# Patient Record
Sex: MALE | Race: BLACK OR AFRICAN AMERICAN | NOT HISPANIC OR LATINO | Employment: UNEMPLOYED | URBAN - METROPOLITAN AREA
[De-identification: names, ages, dates, MRNs, and addresses within clinical notes are randomized per-mention and may not be internally consistent; named-entity substitution may affect disease eponyms.]

---

## 2024-04-01 ENCOUNTER — OFFICE VISIT (OUTPATIENT)
Dept: FAMILY MEDICINE | Facility: CLINIC | Age: 59
End: 2024-04-01
Payer: COMMERCIAL

## 2024-04-01 ENCOUNTER — ANCILLARY PROCEDURE (OUTPATIENT)
Dept: GENERAL RADIOLOGY | Facility: CLINIC | Age: 59
End: 2024-04-01
Attending: FAMILY MEDICINE
Payer: COMMERCIAL

## 2024-04-01 ENCOUNTER — TELEPHONE (OUTPATIENT)
Dept: FAMILY MEDICINE | Facility: CLINIC | Age: 59
End: 2024-04-01

## 2024-04-01 VITALS
OXYGEN SATURATION: 99 % | HEART RATE: 53 BPM | BODY MASS INDEX: 26.37 KG/M2 | SYSTOLIC BLOOD PRESSURE: 142 MMHG | HEIGHT: 73 IN | WEIGHT: 199 LBS | RESPIRATION RATE: 16 BRPM | DIASTOLIC BLOOD PRESSURE: 84 MMHG

## 2024-04-01 DIAGNOSIS — M25.561 BILATERAL CHRONIC KNEE PAIN: Primary | ICD-10-CM

## 2024-04-01 DIAGNOSIS — M25.562 BILATERAL CHRONIC KNEE PAIN: Primary | ICD-10-CM

## 2024-04-01 DIAGNOSIS — M25.562 BILATERAL CHRONIC KNEE PAIN: ICD-10-CM

## 2024-04-01 DIAGNOSIS — M25.561 BILATERAL CHRONIC KNEE PAIN: ICD-10-CM

## 2024-04-01 DIAGNOSIS — G89.29 BILATERAL CHRONIC KNEE PAIN: Primary | ICD-10-CM

## 2024-04-01 DIAGNOSIS — G89.29 BILATERAL CHRONIC KNEE PAIN: ICD-10-CM

## 2024-04-01 PROCEDURE — 73562 X-RAY EXAM OF KNEE 3: CPT | Mod: TC | Performed by: RADIOLOGY

## 2024-04-01 PROCEDURE — 99204 OFFICE O/P NEW MOD 45 MIN: CPT | Mod: GC

## 2024-04-01 RX ORDER — ACETAMINOPHEN 500 MG
500-1000 TABLET ORAL EVERY 6 HOURS PRN
Qty: 200 TABLET | Refills: 1 | Status: SHIPPED | OUTPATIENT
Start: 2024-04-01 | End: 2024-07-17

## 2024-04-01 NOTE — TELEPHONE ENCOUNTER
Forms Request  Name of form/paperwork:  Request for medical opinion  Do we have the form: NO  When is form needed by: ASAP   How would you like the form returned: FAX. Pt will also like a copy after filled out  Fax: [phone]  Patient Notified form requests are processed in 10 business days:  YES  Okay to leave a detailed message? YES

## 2024-04-01 NOTE — COMMUNITY RESOURCES LIST (ENGLISH)
April 1, 2024           YOUR PERSONALIZED LIST OF SERVICES & PROGRAMS           NAVIGATION    Eligibility Screening      Sure - Navigators  Phone: [phone]  Website: https://www.Pervacioorg/about-us/assister-program/navigators/index.jsp  Language: English  Hours: Mon 8:00 AM - 4:00 PM Tue 8:00 AM - 4:00 PM Wed 8:00 AM - 4:00 PM Thu 8:00 AM - 4:00 PM      [employer] [place] - SNAP (formerly food stamps) Screening and Application help  Phone: [phone] [phone]  Website: https://www.[employer].org/programs/[place]-food-helpline/  Language: English  Hours: Mon 10:00 AM - 5:00 PM Tue 10:00 AM - 5:00 PM Wed 10:00 AM - 5:00 PM Thu 10:00 AM - 5:00 PM Fri 10:00 AM - 5:00 PM  Fee: Free  Accessibility: Ada accessible, Blind accommodation, Deaf or hard of hearing, Translation services      Sure - Certified Application Counselor (CAC)  Phone: [phone]  Website: https://www.Pervacioorg/about-us/assister-program/cacs/index.jsp  Language: English  Hours: Mon 8:00 AM - 4:00 PM Tue 8:00 AM - 4:00 PM Wed 8:00 AM - 4:00 PM Thu 8:00 AM - 4:00 PM        ASSISTANCE    Nutrition Benefits      - SNAP Eligibility Assistance  Website: https://www.[employer]/  Language: English  Fee: Free      [employer] [place] - SNAP (formerly food stamps) Screening and Application help  Phone: (623) 529-7342  Website: https://www.[employer].org/programs/[place]-food-helpline/  Language: English  Hours: Mon 10:00 AM - 5:00 PM Tue 10:00 AM - 5:00 PM Wed 10:00 AM - 5:00 PM Thu 10:00 AM - 5:00 PM Fri 10:00 AM - 5:00 PM  Fee: Free  Accessibility: Ada accessible, Blind accommodation, Deaf or hard of hearing, Translation services      [employer] [place] [employer]  Phone: ([phone]  Website: https://www.hungersolutions.org/programs/market-bucks/  Language: English  Hours: Mon 10:00 AM - 5:00 PM Tue 10:00 AM - 5:00 PM Wed 10:00 AM - 5:00 PM Thu 10:00 AM - 5:00 PM Fri 10:00 AM - 5:00 PM  Fee: Self pay    Pantry      House  - Food Support  [address] [place] [address] (Distance: 2.7 miles)  Phone: [phone]) [phone]  Website: https://www.[facility].org  Language: English, Hmong, Wallisian  Fee: Free  Accessibility: Ada accessible, Blind accommodation, Deaf or hard of hearing, Translation services      in the [place] - Food in the [place] at [facility]  [address] [place] [address] (Distance: 10.9 miles)  Phone: [phone] [phone]  Website: https://www.goodintZoomhood.org/our-programs/feeding-the-future/food-in-the-parker/  Language: English  Fee: Free  Accessibility: Ada accessible      Basket Food Shelf - [place] Basket Food Shelf  Phone: (375) [phone]  Website: www.[facility].org  Language: English, Wallisian  Hours: Mon 9:00 AM - 3:30 PM Tue 9:00 AM - 6:30 PM Wed 9:00 AM - 3:30 PM Thu 9:00 AM - 12:30 PM Fri 9:00 AM - 12:30 PM Sat 9:00 AM - 12:00 PM  Fee: Free        & SHELTER    Housing      Free - Client Services  [address] [place] [address] (Distance: 5.8 miles)  Phone: [phone]) [phone]  Website: https://Questra.Jobpartners/  Language: English  Fee: Free  Transportation Options: Free transportation      [employer] [place] - YOUTH half-way  Phone: ([phone]) 512-8388  Website: https://www.WanderflyraXetal.org/  Language: English      Health Link - Housing Stabilization Services  Phone: (392) 542-9513  Website: https://Vhoto.Lookwider/Housing-Stabilization.html  Language: English  Hours: Mon 9:00 AM - 5:00 PM Tue 9:00 AM - 5:00 PM Wed 9:00 AM - 5:00 PM Thu 9:00 AM - 5:00 PM Fri 9:00 AM - 5:00 PM  Fee: Insurance  Accessibility: Deaf or hard of hearing, Translation services    Case Management      Living - Housing Stabilization Services  [address] [place] [address] (Distance: 12.2 miles)  Phone: [phone]) [phone]  Website: https://www.AuditFile  Language: Ukrainian, English, Martiniquais  Fee: Insurance, Self pay      Housing Services[employer] - Housing  Stabilization Services  Phone: (985) 567-5013  Website: https://homebasemn.com/  Language: English  Hours: Mon 8:00 AM - 4:00 PM Tue 8:00 AM - 4:00 PM Wed 8:00 AM - 4:00 PM Thu 8:00 AM - 4:00 PM Fri 8:00 AM - 4:00 PM  Fee: Free  Accessibility: Blind accommodation, Deaf or hard of hearing  Transportation Options: Free transportation      Community Services Inc - Integrated Community Support Services (ICS) and Individualized Home Support (IHS)  Phone: (526) 356-9610  Website: https://www.NICE.mig33/  Language: Irish, English, North Korean, Hmong, Mosotho, Yakut  Hours: Mon 8:00 AM - 5:00 PM Tue 8:00 AM - 5:00 PM Wed 8:00 AM - 5:00 PM Thu 8:00 AM - 5:00 PM Fri 8:00 AM - 5:00 PM  Fee: Insurance  Accessibility: Blind accommodation, Deaf or hard of hearing, Translation services    Payment Assistance      Housing Finance Agency - Section [address]11 Supportive Housing Project-Based Rental Assistance Program (1 PRA)  [address] [facility] [address] [place] [address] (Distance: 4.1 miles)  Phone: (112) 437-1317  Website: https://www.[facility].gov/index.html  Language: English  Fee: Free  Accessibility: Ada accessible      - FINANCIAL SERVICES  Phone: (982) 158-1853  Website: http://www.MCK Communications      30-Days Foundation - Keep the Key  Phone: (147) 663-3971  Website: https://www.iep49-ryqlrliglivmkt.org/programs.html  Language: English  Hours: Mon 7:00 AM - 7:00 PM Tue 7:00 AM - 7:00 PM Wed 7:00 AM - 7:00 PM Thu 7:00 AM - 7:00 PM Fri 7:00 AM - 7:00 PM  Fee: Free    Drop-In Services      [facility] [place] - Warming or cooling center [facility]  [address] [place] [address] (Distance: 1.5 miles)  Language: English, Yakut, Martiniquais  Fee: Free      [facility] [place] - Drop-in center or day shelter  [address] [place] [address] (Distance: 1.5 miles)  Phone: (325) [phone]  Language: Martiniquais, Yakut, English  Fee: Free  Accessibility: Translation services, Blind accommodation,  Deaf or hard of hearing, Ada accessible      LOVE - LAUNDRY LOVE  Website: http://www.laundrylove.org    Mediation & Eviction Prevention      Living - Housing Stabilization Services  [address] [place] [address] (Distance: 12.2 miles)  Phone: (921) [phone]  Website: https://www.Infopia  Language: Romanian, English, Tuvaluan  Fee: Insurance, Self pay      Line - Tenant Rights / Eviction Prevention  Website: https://[facility].org/g-litb-rs-/  Language: English, Mexican      Health Link - Housing Stabilization Services  Phone: (183) 262-9367  Website: https://BlueShift Technologies/Housing-Stabilization.html  Language: English  Hours: Mon 9:00 AM - 5:00 PM Tue 9:00 AM - 5:00 PM Wed 9:00 AM - 5:00 PM Thu 9:00 AM - 5:00 PM Fri 9:00 AM - 5:00 PM  Fee: Insurance  Accessibility: Deaf or hard of hearing, Translation services            Medical Transportation, (NEMT)      Care Mobility - Transportation to medical appointments  [address] [place] [address] (Distance: 6.6 miles)  Phone: (446) [phone]  Website: https://www.[facility].info/Providers/Maximal_Care_Mobility_LLC/Transportation/1?pos=9  Language: English, Mexican, Frisian, Tuvaluan, Hmong  Fee: Self pay, Insurance  Accessibility: Deaf or hard of hearing, Blind accommodation, Ada accessible      Transport - Transportation to medical appointments  [address] [place] [address] (Distance: 6.6 miles)  Phone: [phone]) [phone]  Website: http://www.CoreTrace/  Language: English, Mexican  Fee: Self pay  Accessibility: Blind accommodation, Deaf or hard of hearing, Ada accessible, Translation services  Transportation Options: Free transportation      Social Service [facility] - Neighbor to Neighbor Program  Phone: (761) 936-1505  Email: tamela@[facility].org  Website: https://www.[facility].org/services/older-adults/-services/neighbor-to-neighbor  Language: English  Hours:  Mon 8:00 AM - 5:00 PM Tue 8:00 AM - 5:00 PM Wed 8:00 AM - 5:00 PM Thu 8:00 AM - 5:00 PM Fri 8:00 AM - 5:00 PM  Fee: Insurance, Self pay  Accessibility: Deaf or hard of hearing, Blind accommodation, Translation services    Expense Assistance      Garage - Express Services  [address] [place] [address] (Distance: 10.0 miles)  Phone: (792) 214-2934  Website: https://www.Powerset/express  Language: English      RUNAWAY SAFELINE - RUNAWAY YOUTH CRISIS SERVICES - [facility] RUNAWAY SAFELINE  Phone: (384) [phone]  Website: https://www.[facility]/  Language: English      - Dislocated Worker/Adult [employer] Employment Program  Phone: (879) 302-3731  Email: bassam@Skimlinks  Website: https://Skimlinks/services/employment-services/dislocated-worker-program/  Language: English, Bolivian  Hours: Mon 8:00 AM - 4:30 PM Tue 8:00 AM - 4:30 PM Wed 8:00 AM - 4:30 PM Thu 8:00 AM - 4:30 PM Fri 8:00 AM - 4:30 PM  Fee: Free  Accessibility: Ada accessible    [facility] Circulator Bus  [address] [place] [address] (Distance: 1.5 miles)  Phone: (182) [phone]  Website: http://www.Viva Developments.PrivacyCentral/  Language: English  Fee: Self pay  Accessibility: Ada accessible      - [facility] Circulator Bus  [address] [place] [address] (Distance: 1.5 miles)  Phone: (706) 286-3791  Website: http://www.AntFarm/  Language: English  Fee: Self pay  Accessibility: Ada accessible      - AMTRAK TRAIN SERVICES  Phone: (921) 927-2893  Website: http://www.Metrekare            Bill Payment Assistance      SERVICE ADMINISTRATIVE COMPANY - LIFEe27  Phone: (105) 756-9443  Website: http://www.FreshGrade.org      30-Days Foundation - Energized  Phone: (347) 774-6681  Website: https://www.edp41-uyomiivkdcgkbw.org/programs.html  Language: English  Hours: Mon 7:00 AM - 7:00 PM Tue 7:00 AM - 7:00 PM Wed 7:00 AM - 7:00 PM Thu 7:00 AM - 7:00 PM Fri  7:00 AM - 7:00 PM  Fee: Free      - Dislocated Worker/Adult [employer] Employment Program  Phone: (763) 288-9842  Email: etiennelauro@AirInSpace.[employer]  Website: https://Greenlet Technologies/services/employment-services/dislocated-worker-program/  Language: English, Angolan  Hours: Mon 8:00 AM - 4:30 PM Tue 8:00 AM - 4:30 PM Wed 8:00 AM - 4:30 PM Thu 8:00 AM - 4:30 PM Fri 8:00 AM - 4:30 PM  Fee: Free  Accessibility: Ada accessible               IMPORTANT NUMBERS & WEBSITES        Emergency Services  911  .   [facility]  211 http://211unitedway.org  .   Poison Control  (474) 709-1881 http://[place]poison.org http://wisconsinpoison.org  .     Suicide and Crisis Lifeline  988 http://988Triggerfish Animation Studiosline.org  .   Childhelp [place] Child Abuse Hotline  882.427.3327 http://Childhelphotline.org   .   [place] Sexual Assault Hotline  (976) 362-2497 (HOPE) http://AnyCloud.[employer]   .     National Runaway Safeline  (120) 218-8106 (RUNAWAY) http://PrimeSenseruPromineo studios.org  .   Pregnancy & Postpartum Support  Call/text 636-488-4852  [place]: http://ppsupportmn.org  [place]: http://Tipp24.com/wi  .   Substance Abuse National Helpline ([facility])  589-633-HELP (9327) http://Findtreatment.gov   .                DISCLAIMER: [employer] does not endorse any service providers mentioned in this resource list. [employer] does not guarantee that the services mentioned in this resource list will be available to you or will improve your health or wellness.    [facility]
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
## 2024-04-01 NOTE — PROGRESS NOTES
Preceptor Attestation:  Patient's case reviewed and discussed with the resident, Wade Martinez MD, and I personally evaluated the patient. I agree with written assessment and plan of care.    Supervising Physician:  Laura Will MD   [facility]

## 2024-04-01 NOTE — PATIENT INSTRUCTIONS
Apply Voltaren gel up to 4x daily to both knees. Take tylenol consistently every 6 hours.   Physical therapy will call to schedule your first visit.   We will call you with your Xray results.     See you in a few weeks!

## 2024-04-03 ENCOUNTER — THERAPY VISIT (OUTPATIENT)
Dept: PHYSICAL THERAPY | Facility: REHABILITATION | Age: 59
End: 2024-04-03
Attending: FAMILY MEDICINE
Payer: COMMERCIAL

## 2024-04-03 DIAGNOSIS — G89.29 BILATERAL CHRONIC KNEE PAIN: ICD-10-CM

## 2024-04-03 DIAGNOSIS — M25.561 BILATERAL CHRONIC KNEE PAIN: ICD-10-CM

## 2024-04-03 DIAGNOSIS — M25.562 BILATERAL CHRONIC KNEE PAIN: ICD-10-CM

## 2024-04-03 PROCEDURE — 97110 THERAPEUTIC EXERCISES: CPT | Mod: GP

## 2024-04-03 PROCEDURE — 97161 PT EVAL LOW COMPLEX 20 MIN: CPT | Mod: GP
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
## 2024-04-11 ENCOUNTER — THERAPY VISIT (OUTPATIENT)
Dept: PHYSICAL THERAPY | Facility: REHABILITATION | Age: 59
End: 2024-04-11
Attending: FAMILY MEDICINE
Payer: COMMERCIAL

## 2024-04-11 DIAGNOSIS — G89.29 BILATERAL CHRONIC KNEE PAIN: Primary | ICD-10-CM

## 2024-04-11 DIAGNOSIS — M25.561 BILATERAL CHRONIC KNEE PAIN: Primary | ICD-10-CM

## 2024-04-11 DIAGNOSIS — M25.562 BILATERAL CHRONIC KNEE PAIN: Primary | ICD-10-CM

## 2024-04-11 PROCEDURE — 97535 SELF CARE MNGMENT TRAINING: CPT | Mod: GP

## 2024-04-11 PROCEDURE — 97110 THERAPEUTIC EXERCISES: CPT | Mod: GP

## 2024-04-26 ENCOUNTER — THERAPY VISIT (OUTPATIENT)
Dept: PHYSICAL THERAPY | Facility: REHABILITATION | Age: 59
End: 2024-04-26
Attending: FAMILY MEDICINE
Payer: COMMERCIAL

## 2024-04-26 DIAGNOSIS — G89.29 BILATERAL CHRONIC KNEE PAIN: Primary | ICD-10-CM

## 2024-04-26 DIAGNOSIS — M25.561 BILATERAL CHRONIC KNEE PAIN: Primary | ICD-10-CM

## 2024-04-26 DIAGNOSIS — M25.562 BILATERAL CHRONIC KNEE PAIN: Primary | ICD-10-CM

## 2024-04-26 PROCEDURE — 97112 NEUROMUSCULAR REEDUCATION: CPT | Mod: GP

## 2024-04-26 PROCEDURE — 97116 GAIT TRAINING THERAPY: CPT | Mod: GP

## 2024-04-26 PROCEDURE — 97140 MANUAL THERAPY 1/> REGIONS: CPT | Mod: GP

## 2024-04-29 ENCOUNTER — OFFICE VISIT (OUTPATIENT)
Dept: FAMILY MEDICINE | Facility: CLINIC | Age: 59
End: 2024-04-29
Payer: COMMERCIAL

## 2024-04-29 VITALS
RESPIRATION RATE: 16 BRPM | OXYGEN SATURATION: 97 % | HEART RATE: 81 BPM | SYSTOLIC BLOOD PRESSURE: 135 MMHG | TEMPERATURE: 98.1 F | BODY MASS INDEX: 25.72 KG/M2 | DIASTOLIC BLOOD PRESSURE: 79 MMHG | WEIGHT: 200.4 LBS | HEIGHT: 74 IN

## 2024-04-29 DIAGNOSIS — G89.29 BILATERAL CHRONIC KNEE PAIN: Primary | ICD-10-CM

## 2024-04-29 DIAGNOSIS — M25.562 BILATERAL CHRONIC KNEE PAIN: Primary | ICD-10-CM

## 2024-04-29 DIAGNOSIS — M25.561 BILATERAL CHRONIC KNEE PAIN: Primary | ICD-10-CM

## 2024-04-29 PROCEDURE — 99214 OFFICE O/P EST MOD 30 MIN: CPT | Mod: GC

## 2024-04-29 NOTE — PROGRESS NOTES
"  Assessment & Plan     Bilateral chronic knee pain  Follow up after PT x3 sessions. Pain slightly improved with this and topical NSAID. Upon further discussion, patient notes he has increased instances of popping/giving way in R knee causing near falls. Effusion still present in R knee. Counseled patient to continue treatment plan but will get advanced imaging in R knee to check for meniscal injury, foreign body, etc. Will reassess at annual physical in couple weeks.   - MR Knee Right w/o Contrast; Future      Return in about 2 weeks (around 5/13/2024) for Routine preventive.    Ebenezer Lewis is a 58 year old, presenting for the following health issues:  Pain (Right leg w/swelling x2mo taking tylenol   no other concerns)        4/29/2024     4:01 PM   Additional Questions   Roomed by Sunny         4/29/2024    Information    services provided? No     HPI   Knee pain \"getting a little better.\"  Has done PT x3 sessions. Affirms doing recommended exercises daily.   Has been doing compression sleeve for R knee per PT.   \"They think I might need a cane\" for near falls, which have become more frequent.   Seems to be R knee usually that \"will pop and give out.\"  \"Violet I had to put all my weight on this leg for years.\"   Voltaren gel seems to take away the \"burning\" pain.     Review of Systems  Constitutional, HEENT, cardiovascular, pulmonary, gi and gu systems are negative, except as otherwise noted.      Objective    /79   Pulse 81   Temp 98.1  F (36.7  C)   Resp 16   Ht 1.88 m (6' 2\")   Wt 90.9 kg (200 lb 6.4 oz)   SpO2 97%   BMI 25.73 kg/m    Body mass index is 25.73 kg/m .  Physical Exam   GENERAL: alert and no distress  EYES: Eyes grossly normal to inspection, PERRL and conjunctivae and sclerae normal  SKIN: no suspicious lesions or rashes  NEURO: Normal strength and tone, mentation intact and speech normal  PSYCH: mentation appears normal, affect normal/bright  MSK: R knee " effusion still present        Signed Electronically by: Wade Martinez MD

## 2024-04-29 NOTE — PATIENT INSTRUCTIONS
Call us in next couple weeks if you do not hear from schedulers for MRI.   Continue doing physical therapy, topical Voltaren and tylenol for pain.

## 2024-05-01 ENCOUNTER — TELEPHONE (OUTPATIENT)
Dept: FAMILY MEDICINE | Facility: CLINIC | Age: 59
End: 2024-05-01
Payer: COMMERCIAL

## 2024-05-01 NOTE — TELEPHONE ENCOUNTER
Left message to return my call about scheduling his MRI. Will try to call him again in a few days.     Elsa Duggan, CMA

## 2024-05-03 ENCOUNTER — THERAPY VISIT (OUTPATIENT)
Dept: PHYSICAL THERAPY | Facility: REHABILITATION | Age: 59
End: 2024-05-03
Payer: COMMERCIAL

## 2024-05-03 DIAGNOSIS — G89.29 BILATERAL CHRONIC KNEE PAIN: Primary | ICD-10-CM

## 2024-05-03 DIAGNOSIS — M25.562 BILATERAL CHRONIC KNEE PAIN: Primary | ICD-10-CM

## 2024-05-03 DIAGNOSIS — M25.561 BILATERAL CHRONIC KNEE PAIN: Primary | ICD-10-CM

## 2024-05-03 PROCEDURE — 97140 MANUAL THERAPY 1/> REGIONS: CPT | Mod: GP

## 2024-05-03 PROCEDURE — 97110 THERAPEUTIC EXERCISES: CPT | Mod: GP

## 2024-05-10 ENCOUNTER — THERAPY VISIT (OUTPATIENT)
Dept: PHYSICAL THERAPY | Facility: REHABILITATION | Age: 59
End: 2024-05-10
Payer: COMMERCIAL

## 2024-05-10 DIAGNOSIS — M25.562 BILATERAL CHRONIC KNEE PAIN: Primary | ICD-10-CM

## 2024-05-10 DIAGNOSIS — M25.561 BILATERAL CHRONIC KNEE PAIN: Primary | ICD-10-CM

## 2024-05-10 DIAGNOSIS — G89.29 BILATERAL CHRONIC KNEE PAIN: Primary | ICD-10-CM

## 2024-05-10 PROCEDURE — 97140 MANUAL THERAPY 1/> REGIONS: CPT | Mod: GP

## 2024-05-10 PROCEDURE — 97112 NEUROMUSCULAR REEDUCATION: CPT | Mod: GP

## 2024-05-10 PROCEDURE — 97750 PHYSICAL PERFORMANCE TEST: CPT | Mod: GP

## 2024-05-10 PROCEDURE — 97535 SELF CARE MNGMENT TRAINING: CPT | Mod: GP

## 2024-05-13 ENCOUNTER — HOSPITAL ENCOUNTER (OUTPATIENT)
Facility: AMBULATORY SURGERY CENTER | Age: 59
End: 2024-05-13
Attending: FAMILY MEDICINE
Payer: COMMERCIAL

## 2024-05-13 ENCOUNTER — OFFICE VISIT (OUTPATIENT)
Dept: FAMILY MEDICINE | Facility: CLINIC | Age: 59
End: 2024-05-13
Payer: COMMERCIAL

## 2024-05-13 VITALS
BODY MASS INDEX: 26.11 KG/M2 | SYSTOLIC BLOOD PRESSURE: 139 MMHG | OXYGEN SATURATION: 98 % | HEIGHT: 73 IN | HEART RATE: 62 BPM | RESPIRATION RATE: 20 BRPM | DIASTOLIC BLOOD PRESSURE: 81 MMHG | TEMPERATURE: 98 F | WEIGHT: 197 LBS

## 2024-05-13 DIAGNOSIS — H53.002 AMBLYOPIA OF LEFT EYE: ICD-10-CM

## 2024-05-13 DIAGNOSIS — Z13.220 SCREENING FOR CHOLESTEROL LEVEL: ICD-10-CM

## 2024-05-13 DIAGNOSIS — M25.561 CHRONIC PAIN OF RIGHT KNEE: ICD-10-CM

## 2024-05-13 DIAGNOSIS — Z11.59 NEED FOR HEPATITIS C SCREENING TEST: ICD-10-CM

## 2024-05-13 DIAGNOSIS — Z13.1 SCREENING FOR DIABETES MELLITUS: ICD-10-CM

## 2024-05-13 DIAGNOSIS — G89.29 CHRONIC PAIN OF RIGHT KNEE: ICD-10-CM

## 2024-05-13 DIAGNOSIS — H53.8 BLURRED VISION: ICD-10-CM

## 2024-05-13 DIAGNOSIS — Z11.4 SCREENING FOR HIV (HUMAN IMMUNODEFICIENCY VIRUS): ICD-10-CM

## 2024-05-13 DIAGNOSIS — Z12.5 SCREENING FOR PROSTATE CANCER: ICD-10-CM

## 2024-05-13 DIAGNOSIS — Z00.00 ROUTINE GENERAL MEDICAL EXAMINATION AT A HEALTH CARE FACILITY: Primary | ICD-10-CM

## 2024-05-13 DIAGNOSIS — Z12.11 SCREEN FOR COLON CANCER: ICD-10-CM

## 2024-05-13 LAB — HCV AB SERPL QL IA: NONREACTIVE

## 2024-05-13 PROCEDURE — 99214 OFFICE O/P EST MOD 30 MIN: CPT | Mod: 25

## 2024-05-13 PROCEDURE — 80048 BASIC METABOLIC PNL TOTAL CA: CPT

## 2024-05-13 PROCEDURE — 86803 HEPATITIS C AB TEST: CPT

## 2024-05-13 PROCEDURE — 80061 LIPID PANEL: CPT

## 2024-05-13 PROCEDURE — 99396 PREV VISIT EST AGE 40-64: CPT | Mod: GC

## 2024-05-13 PROCEDURE — G0103 PSA SCREENING: HCPCS

## 2024-05-13 PROCEDURE — 87389 HIV-1 AG W/HIV-1&-2 AB AG IA: CPT

## 2024-05-13 PROCEDURE — 36415 COLL VENOUS BLD VENIPUNCTURE: CPT

## 2024-05-13 RX ORDER — DICLOFENAC SODIUM 75 MG/1
75 TABLET, DELAYED RELEASE ORAL 2 TIMES DAILY
Qty: 60 TABLET | Refills: 1 | Status: SHIPPED | OUTPATIENT
Start: 2024-05-13 | End: 2024-07-17

## 2024-05-13 SDOH — HEALTH STABILITY: PHYSICAL HEALTH: ON AVERAGE, HOW MANY MINUTES DO YOU ENGAGE IN EXERCISE AT THIS LEVEL?: 30 MIN

## 2024-05-13 SDOH — HEALTH STABILITY: PHYSICAL HEALTH: ON AVERAGE, HOW MANY DAYS PER WEEK DO YOU ENGAGE IN MODERATE TO STRENUOUS EXERCISE (LIKE A BRISK WALK)?: 7 DAYS

## 2024-05-13 ASSESSMENT — SOCIAL DETERMINANTS OF HEALTH (SDOH): HOW OFTEN DO YOU GET TOGETHER WITH FRIENDS OR RELATIVES?: ONCE A WEEK

## 2024-05-13 NOTE — COMMUNITY RESOURCES LIST (ENGLISH)
May 13, 2024           YOUR PERSONALIZED LIST OF SERVICES & PROGRAMS           NAVIGATION    Eligibility Screening      [facility] [place] - SNAP (formerly food stamps) Screening and Application help  Phone: [phone]  Website: https://www.Wedding Spot.org/programs/[place]-food-helpline/  Language: English  Hours: Mon 10:00 AM - 5:00 PM Tue 10:00 AM - 5:00 PM Wed 10:00 AM - 5:00 PM Thu 10:00 AM - 5:00 PM Fri 10:00 AM - 5:00 PM  Fee: Free  Accessibility: Ada accessible, Blind accommodation, Deaf or hard of hearing, Translation services      Sure - Certified Application Counselor (CAC)  Phone: [phone]  Website: https://www.j-Graborg/about-us/assister-program/cacs/index.jsp  Language: English  Hours: Mon 8:00 AM - 4:00 PM Tue 8:00 AM - 4:00 PM Wed 8:00 AM - 4:00 PM Thu 8:00 AM - 4:00 PM      Sure - Navigators  Phone: [phone]  Website: https://www.j-Graborg/about-us/assister-program/navigators/index.jsp  Language: English  Hours: Mon 8:00 AM - 4:00 PM Tue 8:00 AM - 4:00 PM Wed 8:00 AM - 4:00 PM Thu 8:00 AM - 4:00 PM        ASSISTANCE    Nutrition [facility] Services - Care Coordination (Healthcare only)  Phone: [phone]  Website: https://Estech.org  Language: English, Yoruba  Hours: Wed 9:00 AM - 11:30 AM Thu 1:00 PM - 4:00 PM, 5:30 PM - 7:00 PM      [employer] [place] - SNAP (formerly food stamps) Screening and Application help  Phone: [phone]  Website: https://www.Wedding Spot.org/programs/[place]-food-helpline/  Language: English  Hours: Mon 10:00 AM - 5:00 PM Tue 10:00 AM - 5:00 PM Wed 10:00 AM - 5:00 PM Thu 10:00 AM - 5:00 PM Fri 10:00 AM - 5:00 PM  Fee: Free  Accessibility: Ada accessible, Blind accommodation, Deaf or hard of hearing, Translation services      [employer] [place] - Market Evon  Phone: [phone]  Website: https://www.Follica/programs/market-evon/  Language: English  Hours: Mon 10:00 AM -  5:00 PM Tue 10:00 AM - 5:00 PM Wed 10:00 AM - 5:00 PM Thu 10:00 AM - 5:00 PM Fri 10:00 AM - 5:00 PM  Fee: Self pay    Pantry      in the [place] - Food in the [place] at [facility]  [address] [place] [address] (Distance: 10.9 miles)  Phone: [phone]  Website: https://www.goodintExercise.comhood.org/our-programs/feeding-the-future/food-in-the-parker/  Language: English  Fee: Free  Accessibility: Ada accessible      House - Food Support  [address] [place] [address] (Distance: 2.7 miles)  Phone: [phone]  Website: https://www.[facility].org  Language: English, Hmong, Canadian  Fee: Free  Accessibility: Ada accessible, Blind accommodation, Deaf or hard of hearing, Translation services      Basket Food Shelf - [place] Basket Food Shelf  Phone: [phone]  Website: www.Celsense.Circadence  Language: English, Canadian  Hours: Mon 9:00 AM - 3:30 PM Tue 9:00 AM - 6:30 PM Wed 9:00 AM - 3:30 PM Thu 9:00 AM - 12:30 PM Fri 9:00 AM - 12:30 PM Sat 9:00 AM - 12:00 PM  Fee: Free        & SHELTER    Case Management      Living - Housing Stabilization Services  [address] [place] [address] (Distance: 12.2 miles)  Phone: [phone]  Website: https://www.Pixtronix  Language: Urdu, English, Gabonese  Fee: Insurance, Self pay      [employer] - Integrated Community Support Services (ICS) and Individualized Home Support (IHS)  Phone: [phone]  Website: https://www.Relay FoodsMaven7.org/  Language: Urdu, English, Kyrgyz, Hmong, Gabonese, Canadian  Hours: Mon 8:00 AM - 5:00 PM Tue 8:00 AM - 5:00 PM Wed 8:00 AM - 5:00 PM Thu 8:00 AM - 5:00 PM Fri 8:00 AM - 5:00 PM  Fee: Insurance  Accessibility: Blind accommodation, Deaf or hard of hearing, Translation services      [employer] - Housing Stabilization Services  Phone: [phone]  Website: https://homebasemn.com/  Language: English  Hours: Mon 8:00 AM - 4:00 PM Tue 8:00  AM - 4:00 PM Wed 8:00 AM - 4:00 PM Thu 8:00 AM - 4:00 PM Fri 8:00 AM - 4:00 PM  Fee: Free  Accessibility: Blind accommodation, Deaf or hard of hearing  Transportation Options: Free transportation    Payment Assistance      Housing Finance Agency - Section 811 Supportive Housing Project-Based Rental Assistance Program (811 PRA)  [address] [facility] [address] [place] [address] (Distance: 4.1 miles)  Phone: [phone]) [phone]  Website: https://www.[facility]/index.html  Language: English  Fee: Free  Accessibility: Ada accessible      - [facility] Patient Assistance Program  Phone: [phone]) 587-5838  Website: https://krabbeconnect.org/community-engagement/[facility]ix[facility]-patient-assistance-program/  Language: English  Hours: Mon 8:00 AM - 5:00 PM Tue 8:00 AM - 5:00 PM Wed 8:00 AM - 5:00 PM Thu 8:00 AM - 5:00 PM Fri 8:00 AM - 5:00 PM  Fee: Free      30-Days Foundation - Keep the Key  Phone: [phone] [phone]  Website: https://www.qfn83-habfnudjmbabqs.org/programs.html  Language: English  Hours: Mon 7:00 AM - 7:00 PM Tue 7:00 AM - 7:00 PM Wed 7:00 AM - 7:00 PM Thu 7:00 AM - 7:00 PM Fri 7:00 AM - 7:00 PM  Fee: Free    Mediation & Eviction Prevention      Living - Housing Stabilization Services  [address] [place] [address] (Distance: 12.2 miles)  Phone: [phone]  Website: https://www.Talknote  Language: Amharic, English, Macedonian  Fee: Insurance, Self pay      Line - Tenant Rights / Eviction Prevention  Website: https://Freelandlinemn.org/j-uyla-wd-/  Language: English, Albanian      Health Link - Housing Stabilization Services  Phone: [phone]) 904-1845  Website: https://HelpHub/Housing-Stabilization.html  Language: English  Hours: Mon 9:00 AM - 5:00 PM Tue 9:00 AM - 5:00 PM Wed 9:00 AM - 5:00 PM Thu 9:00 AM - 5:00 PM Fri 9:00 AM - 5:00 PM  Fee: Insurance  Accessibility: Deaf or hard of hearing, Translation services            Medical Transportation, (NEMT)      Care  Mobility - Transportation to medical appointments  [address] [place] [address] (Distance: 6.6 miles)  Phone: [phone]  Website: https://www.[facility]/Providers/Maximal_Care_Mobility_LLC/Transportation/1?pos=9  Language: English, Norwegian, Haitian, Djiboutian, Hmong  Fee: Self pay, Insurance  Accessibility: Deaf or hard of hearing, Blind accommodation, Ada accessible      Transport - Transportation to medical appointments  [address] [place] [address] (Distance: 6.6 miles)  Phone: [phone]  Website: http://www.Wandrian/  Language: English, Norwegian  Fee: Self pay  Accessibility: Blind accommodation, Deaf or hard of hearing, Ada accessible, Translation services  Transportation Options: Free transportation      [employer] of [place] - Neighbor to Neighbor Program  Phone: (830) [phone]  Email: tamela@[facility]  Website: https://www.[facility].org/services/older-adults/-services/neighbor-to-neighbor  Language: English  Hours: Mon 8:00 AM - 5:00 PM Tue 8:00 AM - 5:00 PM Wed 8:00 AM - 5:00 PM Thu 8:00 AM - 5:00 PM Fri 8:00 AM - 5:00 PM  Fee: Insurance, Self pay  Accessibility: Deaf or hard of hearing, Blind accommodation, Translation services    Expense Assistance      Garage - Pre-Purchase Inspections  [address] [place] [address] (Distance: 10.0 miles)  Phone: [phone]  Website: https://www.theNuru International.org/ppi  Language: English      - Dislocated Worker/Adult [employer] Employment Program  Phone: (375) 404-5574  Email: bassam@[employer]  Website: https://[employer]/services/employment-services/dislocated-worker-program/  Language: English, Djiboutian  Hours: Mon 8:00 AM - 4:30 PM Tue 8:00 AM - 4:30 PM Wed 8:00 AM - 4:30 PM Thu 8:00 AM - 4:30 PM Fri 8:00 AM - 4:30 PM  Fee: Free  Accessibility: Ada accessible      Care Sabine - Air Care Sabine  Email: info@aircarealliance.org  Website:  https://www.aircarealliance.org    Coordination      - [employer] - [place] Circulator Bus  [address] [place] [address] (Distance: 1.5 miles)  Phone: [phone]) [phone]  Website: http://www.Denwa Communications/  Language: English  Fee: Self pay  Accessibility: Ada accessible      Partners Ride Care - Ride Care  [address] [place] [address] (Distance: 9.3 miles)  Website: https://www.[employer]/insurance/[place]-medical-assistance/  Language: English  Fee: Free  Transportation Options: Free transportation      - [place] - AMTRAK - [place]  Phone: [phone]  Website: http://QVIVO            Bill Payment Assistance      [facility] - Care Coordination (Healthcare only)  Phone: [phone]  Website: https://[facility]pl[facility].Ubiqu[employer] Networks  Language: English, [place]  Hours: Wed 9:00 AM - 11:30 AM Thu 1:00 PM - 4:00 PM, 5:30 PM - 7:00 PM      30-Days Foundation - Energized  Phone: [phone]  Website: https://www.mko37-ogjpyovhkqzphp.org/programs.html  Language: English  Hours: Mon 7:00 AM - 7:00 PM Tue 7:00 AM - 7:00 PM Wed 7:00 AM - 7:00 PM Thu 7:00 AM - 7:00 PM Fri 7:00 AM - 7:00 PM  Fee: Free      - Dislocated Worker/Adult WIOA Employment Program  Phone: (571) [phone]  Email: bassam@[employer]  Website: https://[employer].org/services/employment-services/dislocated-worker-program/  Language: English, Ghanaian  Hours: Mon 8:00 AM - 4:30 PM Tue 8:00 AM - 4:30 PM Wed 8:00 AM - 4:30 PM Thu 8:00 AM - 4:30 PM Fri 8:00 AM - 4:30 PM  Fee: Free  Accessibility: Ada accessible               IMPORTANT NUMBERS & WEBSITES        Emergency Services  911  .   [facility]  211 http://211Labadieway.org  .   Poison Control  (873) 345-3156 http://mnpoison.org http://wisconsinpoison.org  .     Suicide and Crisis Lifeline  988 http://988lifeline.org  .   Childhelp [place] Child Abuse Hotline  950.649.1357 http://Childhelphotline.org   .   National  Sexual Assault Hotline  (859) 911-7969 (HOPE) http://My Healthy Worldn.Udacity   .     National Runaway Safeline  (120) 686-9122 (RUNAWAY) http://Kipo.Udacity  .   Pregnancy & Postpartum Support  Call/text 788-445-6976  [place]: http://ppsupportmn.org  [place]: http://psichapters.com/wi  .   Substance Abuse National Helpline ([facility])  722-041-HELP (7424) http://Findtreatment.gov   .                DISCLAIMER: These resources have been generated via the IdenTrust Platform. IdenTrust does not endorse any service providers mentioned in this resource list. I[employer] does not guarantee that the services mentioned in this resource list will be available to you or will improve your health or wellness.    [facility]

## 2024-05-13 NOTE — PROGRESS NOTES
"Colonoscopy Scheduling Screen    Are you active on MyChart?   No    What insurance is in the chart?  Other:  [employer]) Insurance    Ordering/Referring Provider: Dr. Martinez   (If ordering provider performs procedure, schedule with ordering provider unless otherwise instructed. )    BMI: Estimated body mass index is 25.99 kg/m  as calculated from the following:    Height as of this encounter: 1.854 m (6' 1\").    Weight as of this encounter: 89.4 kg (197 lb).     Sedation Ordered  moderate sedation.   If patient BMI > 50 do not schedule in ASC.    If patient BMI > 45 do not schedule at [facility].    Are you taking methadone or Suboxone?  No    Have you had difficulties, pain, or discomfort during past endoscopy procedures?  No    Are you taking any prescription medications for pain 3 or more times per week?   NO, No RN review required.    Do you have a history of malignant hyperthermia or adverse reaction to anesthesia?  No       Have you been diagnosed or told you have pulmonary hypertension?   No    Do you have an LVAD?  No    Have you been told you have moderate to severe sleep apnea?  No    Have you been told you have COPD, asthma, or any other lung disease?  No    Do you have any heart conditions, chest pain, heart surgeries, pacemaker/defibrillator?  No     Have you ever had or are you awaiting a heart or lung transplant?   No    Have you had a stroke or transient ischemic attack (TIA aka \"mini stroke\" in the last 6 months?   No    Have you been diagnosed with or been told you have cirrhosis of the liver?   No    Are you currently on dialysis?   No    Do you need assistance transferring?   No    BMI: Estimated body mass index is 25.99 kg/m  as calculated from the following:    Height as of this encounter: 1.854 m (6' 1\").    Weight as of this encounter: 89.4 kg (197 lb).     Is patients BMI > 40 and scheduling location [facility]?  No    Do you take the medication Phentermine, Ozempic or Wegovy?  No    Do you take the " medication Naltrexone?  No    Do you take blood thinners?  No    Do you get short of breath climbing a short flight of stairs?  No    Did you have an operation to remove par of your intestines?  No        Prep   Are you currently on dialysis or do you have chronic kidney disease?  No    Do you have a diagnosis of diabetes?  No    Do you have a diagnosis of cystic fibrosis (CF)?  No    On a regular basis do you go 3 -5 days between bowel movements?  No    BMI > 40?  No    Preferred Pharmacy:    [id number] - [place], [place] - [address] AT [facility] [address]  [address]  [place] [address]  Phone: [phone] Fax: [phone]      Final Scheduling Details   Colonoscopy prep sent?  Standard MiraLAX    Procedure scheduled  Colonoscopy    Surgeon:  Dr. Martinez     Date of procedure:  6/6/24 @ 1pm     Schedule PAC:   No - Not required for this site.    [place]  [facility]    Sedation   Moderate Sedation    Patient Reminders:   You will receive a call from a Nurse to review instructions and health history.  This assessment must be completed prior to your procedure.  Failure to complete the Nurse assessment may result in the procedure being cancelled.      On the day of your procedure, please designate an adult(s) who can drive you home stay with you for the next 24 hours. The medicines used in the exam will make you sleepy. You will not be able to drive.      You cannot take public transportation, ride share services, or non-medical taxi service without a responsible caregiver.  Medical transport services are allowed with the requirement that a responsible caregiver will receive you at your destination.  We require that drivers and caregivers are confirmed prior to your procedure.

## 2024-05-13 NOTE — PATIENT INSTRUCTIONS
"STOP diclofenac gel, START diclofenac pills for your pain. Continue tylenol and icing.   Call [phone] to get your brace. We will let you know what your MRI shows.   Someone will call to schedule colonoscopy and eye exam. We will call you with your lab results.     Preventive Care Advice   This is general advice we often give to help people stay healthy. Your care team may have specific advice just for you. Please talk to your care team about your own preventive care needs.  Lifestyle  Exercise at least 150 minutes each week (30 minutes a day, 5 days a week).  Do muscle strengthening activities 2 days a week. These help control your weight and prevent disease.  No smoking.  Wear sunscreen to prevent skin cancer.  Have your home tested for radon every 2 to 5 years. Radon is a colorless, odorless gas that can harm your lungs. To learn more, go to www.health.[facility].mn.us and search for \"Radon in Homes.\"  Keep guns unloaded and locked up in a safe place like a safe or gun vault, or, use a gun lock and hide the keys. Always lock away bullets separately. To learn more, visit Ancestry.mn.gov and search for \"safe gun storage.\"  Nutrition  Eat 5 or more servings of fruits and vegetables each day.  Try wheat bread, brown rice and whole grain pasta (instead of white bread, rice, and pasta).  Get enough calcium and vitamin D. Check the label on foods and aim for 100% of the RDA (recommended daily allowance).  Regular exams  Have a dental exam and cleaning every 6 months.  See your health care team every year to talk about:  Any changes in your health.  Any medicines your care team has prescribed.  Preventive care, family planning, and ways to prevent chronic diseases.  Shots (vaccines)   HPV shots (up to age 26), if you've never had them before.  Hepatitis B shots (up to age 59), if you've never had them before.  COVID-19 shot: Get this shot when it's due.  Flu shot: Get a flu shot every year.  Tetanus shot: Get a tetanus shot " every 10 years.  Pneumococcal, hepatitis A, and RSV shots: Ask your care team if you need these based on your risk.  Shingles shot (for age 50 and up).  General health tests  Diabetes screening:  Starting at age 35, Get screened for diabetes at least every 3 years.  If you are younger than age 35, ask your care team if you should be screened for diabetes.  Cholesterol test: At age 39, start having a cholesterol test every 5 years, or more often if advised.  Bone density scan (DEXA): At age 50, ask your care team if you should have this scan for osteoporosis (brittle bones).  Hepatitis C: Get tested at least once in your life.  Abdominal aortic aneurysm screening: Talk to your doctor about having this screening if you:  Have ever smoked; and  Are biologically male; and  Are between the ages of 65 and 75.  STIs (sexually transmitted infections)  Before age 24: Ask your care team if you should be screened for STIs.  After age 24: Get screened for STIs if you're at risk. You are at risk for STIs (including HIV) if:  You are sexually active with more than one person.  You don't use condoms every time.  You or a partner was diagnosed with a sexually transmitted infection.  If you are at risk for HIV, ask about PrEP medicine to prevent HIV.  Get tested for HIV at least once in your life, whether you are at risk for HIV or not.  Cancer screening tests  Cervical cancer screening: If you have a cervix, begin getting regular cervical cancer screening tests at age 21. Most people who have regular screenings with normal results can stop after age 65. Talk about this with your provider.  Breast cancer scan (mammogram): If you've ever had breasts, begin having regular mammograms starting at age 40. This is a scan to check for breast cancer.  Colon cancer screening: It is important to start screening for colon cancer at age 45.  Have a colonoscopy test every 10 years (or more often if you're at risk) Or, ask your provider about  stool tests like a FIT test every year or Cologuard test every 3 years.  To learn more about your testing options, visit: www.Hopper/247330.pdf.  For help making a decision, visit: vera/jc61172.  Prostate cancer screening test: If you have a prostate and are age 55 to 69, ask your provider if you would benefit from a yearly prostate cancer screening test.  Lung cancer screening: If you are a current or former smoker age 50 to 80, ask your care team if ongoing lung cancer screenings are right for you.  For informational purposes only. Not to replace the advice of your health care provider. Copyright   2023 [facility]. All rights reserved. Clinically reviewed by the [facility] Transitions Program. Beauteeze.com [id number] - REV 04/24.
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
## 2024-05-14 LAB
ANION GAP SERPL CALCULATED.3IONS-SCNC: 22 MMOL/L (ref 7–15)
BUN SERPL-MCNC: 17.9 MG/DL (ref 6–20)
CALCIUM SERPL-MCNC: 9.1 MG/DL (ref 8.6–10)
CHLORIDE SERPL-SCNC: 103 MMOL/L (ref 98–107)
CHOLEST SERPL-MCNC: 192 MG/DL
CREAT SERPL-MCNC: 0.92 MG/DL (ref 0.67–1.17)
DEPRECATED HCO3 PLAS-SCNC: 15 MMOL/L (ref 22–29)
EGFRCR SERPLBLD CKD-EPI 2021: >90 ML/MIN/1.73M2
FASTING STATUS PATIENT QL REPORTED: ABNORMAL
FASTING STATUS PATIENT QL REPORTED: NORMAL
GLUCOSE SERPL-MCNC: 89 MG/DL (ref 70–99)
HDLC SERPL-MCNC: 89 MG/DL
HIV 1+2 AB+HIV1 P24 AG SERPL QL IA: NONREACTIVE
LDLC SERPL CALC-MCNC: 88 MG/DL
NONHDLC SERPL-MCNC: 103 MG/DL
POTASSIUM SERPL-SCNC: 4.1 MMOL/L (ref 3.4–5.3)
PSA SERPL DL<=0.01 NG/ML-MCNC: 2.75 NG/ML (ref 0–3.5)
SODIUM SERPL-SCNC: 140 MMOL/L (ref 135–145)
TRIGL SERPL-MCNC: 74 MG/DL

## 2024-05-15 ENCOUNTER — THERAPY VISIT (OUTPATIENT)
Dept: PHYSICAL THERAPY | Facility: REHABILITATION | Age: 59
End: 2024-05-15
Payer: COMMERCIAL

## 2024-05-15 DIAGNOSIS — M25.561 BILATERAL CHRONIC KNEE PAIN: Primary | ICD-10-CM

## 2024-05-15 DIAGNOSIS — G89.29 BILATERAL CHRONIC KNEE PAIN: Primary | ICD-10-CM

## 2024-05-15 DIAGNOSIS — M25.562 BILATERAL CHRONIC KNEE PAIN: Primary | ICD-10-CM

## 2024-05-15 PROCEDURE — 97110 THERAPEUTIC EXERCISES: CPT | Mod: GP
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
## 2024-05-16 ENCOUNTER — TELEPHONE (OUTPATIENT)
Dept: FAMILY MEDICINE | Facility: CLINIC | Age: 59
End: 2024-05-16

## 2024-05-22 ENCOUNTER — THERAPY VISIT (OUTPATIENT)
Dept: PHYSICAL THERAPY | Facility: REHABILITATION | Age: 59
End: 2024-05-22
Payer: COMMERCIAL

## 2024-05-22 DIAGNOSIS — M25.562 BILATERAL CHRONIC KNEE PAIN: Primary | ICD-10-CM

## 2024-05-22 DIAGNOSIS — M25.561 BILATERAL CHRONIC KNEE PAIN: Primary | ICD-10-CM

## 2024-05-22 DIAGNOSIS — G89.29 BILATERAL CHRONIC KNEE PAIN: Primary | ICD-10-CM

## 2024-05-22 PROCEDURE — 97535 SELF CARE MNGMENT TRAINING: CPT | Mod: GP

## 2024-05-22 NOTE — PROGRESS NOTES
PLAN  Therapy placed on hold until further assessment with MD.    Beginning/End Dates of Progress Note Reporting Period:  4/3/24 to 05/22/2024    Referring Provider:  Laura Will         05/22/24 0500   Appointment Info   Signing clinician's name / credentials Milagros Allan, PT, DPT   Total/Authorized Visits up to 12 visits   Visits Used 7   Medical Diagnosis M25.561, M25.562, G89.29 (ICD-10-CM) - Bilateral chronic knee pain   PT Tx Diagnosis bilateral knee pain   Quick Adds Certification   Progress Note/Certification   Start of Care Date 04/03/24   Onset of illness/injury or Date of Surgery 04/01/24  (onset date years prior, order date 4/1/24)   Therapy Frequency 1x/week   Predicted Duration up to 12 weeks   Certification date from 04/03/24   Certification date to 06/26/24   Progress Note Due Date 06/26/24   Progress Note Completed Date 05/22/24   GOALS   PT Goals 2;3   PT Goal 1   Goal Identifier HEP   Goal Description pt will demonstrate independence and report compliance with HEP to facilitate improved independent symptom mgmt.   Rationale to maximize safety and independence with performance of ADLs and functional tasks;to maximize safety and independence with self cares   Goal Progress in progress   Target Date 06/26/24   PT Goal 2   Goal Identifier activity tolerance   Goal Description pt will report ability to walk and stand for greater than or equal to 1 hour without needing to rest due to pain to facilitate improved ease with community ambulation and ability to return to work.   Rationale to maximize safety and independence with performance of ADLs and functional tasks;to maximize safety and independence within the home;to maximize safety and independence within the community;to maximize safety and independence with self cares   Goal Progress in progress   Target Date 06/26/24   PT Goal 3   Goal Identifier knee pain   Goal Description pt will report less than or equal to 6/10 worst left knee pain to  "facilitate improved tolerance to functional mobility tasks and improved QOL.   Rationale to maximize safety and independence with performance of ADLs and functional tasks;to maximize safety and independence within the home;to maximize safety and independence within the community;to maximize safety and independence with transportation;to maximize safety and independence with self cares   Goal Progress in progress, still often 10/10   Target Date 06/26/24   Subjective Report   Subjective Report Obtained hinged knee brace. Thinks it may help with walking but not sure it's adjusted appropriately. Said he talked to Dr. Martinez about ordering an MRI. Pain rated at 10+/10 and feels swelling has worsened. Continues to perform HEP as able and ice and elevate without relief.   Objective Measures   Objective Measures Objective Measure 1;Objective Measure 2;Objective Measure 3;Objective Measure 4   Objective Measure 2   Objective Measure observation   Details worsening swelling at right medial and superior knee. Bogginess with palpation to edematous tissues.   Objective Measure 3   Objective Measure gait   Details antalgic lacking full WB'ing R LE   Treatment Interventions (PT)   Interventions Self Care/Home Management   Therapeutic Procedure/Exercise   PTRx Ther Proc 1 Supine Hamstring Stretch   PTRx Ther Proc 1 - Details 2 x 30\" bethany cues to keep head and shoulders relaxed    PTRx Ther Proc 2 Supine Heel Slides   PTRx Ther Proc 2 - Details x 10 reps with therapist overpressure at end range knee flexion on last 3 reps    PTRx Ther Proc 3 Standing Gastroc Stretch   PTRx Ther Proc 3 - Details HEP   PTRx Ther Proc 4 Standing Soleus Stretch   PTRx Ther Proc 4 - Details HEP   PTRx Ther Proc 5 Sit to Stand No Hands   PTRx Ther Proc 5 - Details HEP   PTRx Ther Proc 6  Hip Flexor Stretch Bin Test Position   PTRx Ther Proc 6 - Details HEP   PTRx Ther Proc 7 Hip Abduction Straight Leg Raise   PTRx Ther Proc 7 - Details 2 x 15 reps bethany " "  PTRx Ther Proc 8 Bridging #1   PTRx Ther Proc 8 - Details progressed from 10 to 15 reps x 2 sets   PTRx Ther Proc 9 Hip Flexion Straight Leg Raise   PTRx Ther Proc 9 - Details 2 x 15 reps bethany    PTRx Ther Proc 10 Piriformis Stretch Above 90 Degress Supine   PTRx Ther Proc 10 - Details 3 x 30\" on right   PTRx Ther Proc 11 Hip Adduction Straight Leg Raise   PTRx Ther Proc 11 - Details 2 x 10 reps bilaterally    Skilled Intervention HEP for knee mobility and strengthening   Patient Response/Progress improved knee flexion AROM to 125 after exercise, no change in pain during or after exercise   Self Care/home Management   ADL/Home Mgmt Training (50248) 25   Self Care Self Care 2;Self Care 3   Self Care 1 brace   Self Care 1 - Details brace hinging the wrong way, adjusted with pt report of significant improvement in comfort level   Self Care 2 worsening pain and edema   Self Care 2 - Details continue to ice and elevate. Agreed to hold off on PT until further assessment. discussed next steps for seeking care.  encouraged to call to schedule MRI and follow up with Dr. Martinez, who may consider referral to orthopedist. Can return to PT after further assessment is complete and is cleared by MD.   Self Care 3 assistive device   Self Care 3 - Details pt still wanting to obtain but not sure if he wants insurance to cover   Skilled Intervention pain mgmt   Patient Response/Progress agreeable with plan   Education   Learner/Method Patient   Plan   Home program ptrx   Comments   Comments Pt has attended  7 physical therapy visits for bilateral R>L knee pain. Pt reports no change in pain and worsening right knee swelling as of the past few weeks. Pt agreeable to holding PT until further assessment has been completed.   Total Session Time   Timed Code Treatment Minutes 25   Total Treatment Time (sum of timed and untimed services) 25     "

## 2024-06-05 RX ORDER — LIDOCAINE 40 MG/G
CREAM TOPICAL
Status: CANCELLED | OUTPATIENT
Start: 2024-06-05

## 2024-06-05 RX ORDER — SODIUM CHLORIDE, SODIUM LACTATE, POTASSIUM CHLORIDE, CALCIUM CHLORIDE 600; 310; 30; 20 MG/100ML; MG/100ML; MG/100ML; MG/100ML
INJECTION, SOLUTION INTRAVENOUS CONTINUOUS
Status: CANCELLED | OUTPATIENT
Start: 2024-06-05

## 2024-07-12 ENCOUNTER — HOSPITAL ENCOUNTER (OUTPATIENT)
Dept: MRI IMAGING | Facility: HOSPITAL | Age: 59
Discharge: HOME | End: 2024-07-12
Attending: FAMILY MEDICINE
Payer: COMMERCIAL

## 2024-07-12 ENCOUNTER — HOSPITAL ENCOUNTER (OUTPATIENT)
Dept: RADIOLOGY | Facility: HOSPITAL | Age: 59
Discharge: HOME | End: 2024-07-12
Attending: FAMILY MEDICINE
Payer: COMMERCIAL

## 2024-07-12 DIAGNOSIS — M25.562 BILATERAL CHRONIC KNEE PAIN: ICD-10-CM

## 2024-07-12 DIAGNOSIS — M25.561 BILATERAL CHRONIC KNEE PAIN: ICD-10-CM

## 2024-07-12 DIAGNOSIS — G89.29 BILATERAL CHRONIC KNEE PAIN: ICD-10-CM

## 2024-07-12 DIAGNOSIS — Z01.89 ENCOUNTER FOR IMAGING TO SCREEN FOR METAL PRIOR TO MRI: ICD-10-CM

## 2024-07-12 PROCEDURE — 73721 MRI JNT OF LWR EXTRE W/O DYE: CPT | Mod: RT

## 2024-07-12 PROCEDURE — 73590 X-RAY EXAM OF LOWER LEG: CPT | Mod: LT

## 2024-07-13 DIAGNOSIS — S83.241A TEAR OF MEDIAL MENISCUS OF RIGHT KNEE, CURRENT, UNSPECIFIED TEAR TYPE, INITIAL ENCOUNTER: Primary | ICD-10-CM

## 2024-07-13 NOTE — PROGRESS NOTES
Patient with chronic R knee pain, unchanged after numerous session with PT.   XR only showed joint effusion.   Obtained MR. Shows medial mensicus tear with grade II-III cartilage loss and surface irregularity. Also has moderate quad tendiopathy and mild-to-moderate patellar tendinopathy.   Priority orthopedics referral placed. Will update patient.       Wade Martinez MD  PGY-3, [facility] [place] [facility]

## 2024-07-17 ENCOUNTER — OFFICE VISIT (OUTPATIENT)
Dept: FAMILY MEDICINE | Facility: CLINIC | Age: 59
End: 2024-07-17
Payer: COMMERCIAL

## 2024-07-17 VITALS
SYSTOLIC BLOOD PRESSURE: 136 MMHG | DIASTOLIC BLOOD PRESSURE: 84 MMHG | BODY MASS INDEX: 26.24 KG/M2 | TEMPERATURE: 98.8 F | OXYGEN SATURATION: 98 % | HEIGHT: 73 IN | WEIGHT: 198 LBS | RESPIRATION RATE: 20 BRPM | HEART RATE: 71 BPM

## 2024-07-17 DIAGNOSIS — S83.241D TEAR OF MEDIAL MENISCUS OF RIGHT KNEE, CURRENT, UNSPECIFIED TEAR TYPE, SUBSEQUENT ENCOUNTER: Primary | ICD-10-CM

## 2024-07-17 PROCEDURE — 99213 OFFICE O/P EST LOW 20 MIN: CPT | Mod: GC

## 2024-07-17 RX ORDER — ACETAMINOPHEN 500 MG
500-1000 TABLET ORAL EVERY 6 HOURS PRN
Qty: 200 TABLET | Refills: 1 | Status: SHIPPED | OUTPATIENT
Start: 2024-07-17

## 2024-07-17 RX ORDER — IBUPROFEN 600 MG/1
600 TABLET, FILM COATED ORAL EVERY 6 HOURS PRN
Qty: 90 TABLET | Refills: 1 | Status: SHIPPED | OUTPATIENT
Start: 2024-07-17

## 2024-07-17 NOTE — PROGRESS NOTES
"  Assessment & Plan     Tear of medial meniscus of right knee, current, unspecified tear type, subsequent encounter  Follow up. MRI since last visit showing meniscal tear in addition to grade II/III cartilage loss, moderate quadriceps tendinopathy and mild-to-moderate patellar tendinopathy. Patient was referred to orthopedic surgery, and is now scheduled to see them on 7/25/24. Patient continues to take NSAIDs and tylenol which does provide relief. Bracing is also helpful. Continues to do exercises he learned from PT when pain isn't too bothersome. Will await ortho's recs. Did mention to patient he will need to continue working towards tobacco cessation in anticipation of potential procedure.   - acetaminophen (TYLENOL) 500 MG tablet; Take 1-2 tablets (500-1,000 mg) by mouth every 6 hours as needed for mild pain  - ibuprofen (ADVIL/MOTRIN) 600 MG tablet; Take 1 tablet (600 mg) by mouth every 6 hours as needed for moderate pain      Return if symptoms worsen or fail to improve.    Ebenezer Lewis is a 58 year old, presenting for the following health issues:  Pain (Pain on the right Knee. Going on for couple years and is getting worse./Left knee feel like burning.)    HPI   Hasn't tried diclofenac because wife worried about heart attack. Tylenol helpful but ran out. Does use ibuprofen.Has been using brace. Swelling has gone down some. Does PT exercises when he can tolerate.  Still smoking about a pack every 3-4 days. Tries to only smoke half the cigarette before putting it out. Hesitant about trying medications to help, thinks he can quit on his own. 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      Objective    /84   Pulse 71   Temp 98.8  F (37.1  C) (Tympanic)   Resp 20   Ht 1.854 m (6' 1\")   Wt 89.8 kg (198 " lb)   SpO2 98%   BMI 26.12 kg/m    Body mass index is 26.12 kg/m .  Physical Exam   GENERAL: alert and no distress  EYES: Eyes grossly normal to inspection, amblyopia of L eye  SKIN: no suspicious lesions or rashes  NEURO: no focal deficits  PSYCH: mentation appears normal, affect normal/bright  MSK: R knee effusion still present        Signed Electronically by: Wade Martinez MD

## 2024-07-17 NOTE — PROGRESS NOTES
Preceptor Attestation:   Patient seen, evaluated and discussed with the resident Dr. Wade Martinez. I have verified the content of the note, which accurately reflects my assessment of the patient and the plan of care.    Supervising Physician:  Benjamin Rosenstein, MD, MA  [facility] [place] [facility]  [facility]

## 2024-07-17 NOTE — PATIENT INSTRUCTIONS
Remember your ortho surgery appt on 7/25/24 at 1:40PM with Dr. Galindo  Address: [address], [place] [address]    Call [phone] [option 2] to schedule colonoscopy.     Call [phone] to get scheduled with [facility] in [place] (next to [facility]).

## 2024-07-24 PROBLEM — M25.561 BILATERAL CHRONIC KNEE PAIN: Status: RESOLVED | Noted: 2024-04-03 | Resolved: 2024-07-24

## 2024-07-24 PROBLEM — M25.562 BILATERAL CHRONIC KNEE PAIN: Status: RESOLVED | Noted: 2024-04-03 | Resolved: 2024-07-24

## 2024-07-24 PROBLEM — G89.29 BILATERAL CHRONIC KNEE PAIN: Status: RESOLVED | Noted: 2024-04-03 | Resolved: 2024-07-24

## 2024-07-24 NOTE — PROGRESS NOTES
DISCHARGE  Reason for Discharge: Patient has failed to schedule further appointments.    Equipment Issued: theraband     Discharge Plan: Patient to continue home program.    Referring Provider:  Laura Will       05/22/24 0500   Appointment Info   Signing clinician's name / credentials Milagros Allan, PT, DPT   Total/Authorized Visits up to 12 visits   Visits Used 7   Medical Diagnosis M25.561, M25.562, G89.29 (ICD-10-CM) - Bilateral chronic knee pain   PT Tx Diagnosis bilateral knee pain   Quick Adds Certification   Progress Note/Certification   Start of Care Date 04/03/24   Onset of illness/injury or Date of Surgery 04/01/24  (onset date years prior, order date 4/1/24)   Therapy Frequency 1x/week   Predicted Duration up to 12 weeks   Certification date from 04/03/24   Certification date to 06/26/24   Progress Note Due Date 06/26/24   Progress Note Completed Date 05/22/24   GOALS   PT Goals 2;3   PT Goal 1   Goal Identifier HEP   Goal Description pt will demonstrate independence and report compliance with HEP to facilitate improved independent symptom mgmt.   Rationale to maximize safety and independence with performance of ADLs and functional tasks;to maximize safety and independence with self cares   Goal Progress in progress   Target Date 06/26/24   PT Goal 2   Goal Identifier activity tolerance   Goal Description pt will report ability to walk and stand for greater than or equal to 1 hour without needing to rest due to pain to facilitate improved ease with community ambulation and ability to return to work.   Rationale to maximize safety and independence with performance of ADLs and functional tasks;to maximize safety and independence within the home;to maximize safety and independence within the community;to maximize safety and independence with self cares   Goal Progress in progress   Target Date 06/26/24   PT Goal 3   Goal Identifier knee pain   Goal Description pt will report less than or equal to 6/10  "worst left knee pain to facilitate improved tolerance to functional mobility tasks and improved QOL.   Rationale to maximize safety and independence with performance of ADLs and functional tasks;to maximize safety and independence within the home;to maximize safety and independence within the community;to maximize safety and independence with transportation;to maximize safety and independence with self cares   Goal Progress in progress, still often 10/10   Target Date 06/26/24   Subjective Report   Subjective Report Obtained hinged knee brace. Thinks it may help with walking but not sure it's adjusted appropriately. Said he talked to Dr. Martinez about ordering an MRI. Pain rated at 10+/10 and feels swelling has worsened. Continues to perform HEP as able and ice and elevate without relief.   Objective Measures   Objective Measures Objective Measure 1;Objective Measure 2;Objective Measure 3;Objective Measure 4   Objective Measure 2   Objective Measure observation   Details worsening swelling at right medial and superior knee. Bogginess with palpation to edematous tissues.   Objective Measure 3   Objective Measure gait   Details antalgic lacking full WB'ing R LE   Treatment Interventions (PT)   Interventions Self Care/Home Management   Therapeutic Procedure/Exercise   PTRx Ther Proc 1 Supine Hamstring Stretch   PTRx Ther Proc 1 - Details 2 x 30\" bethany cues to keep head and shoulders relaxed    PTRx Ther Proc 2 Supine Heel Slides   PTRx Ther Proc 2 - Details x 10 reps with therapist overpressure at end range knee flexion on last 3 reps    PTRx Ther Proc 3 Standing Gastroc Stretch   PTRx Ther Proc 3 - Details HEP   PTRx Ther Proc 4 Standing Soleus Stretch   PTRx Ther Proc 4 - Details HEP   PTRx Ther Proc 5 Sit to Stand No Hands   PTRx Ther Proc 5 - Details HEP   PTRx Ther Proc 6  Hip Flexor Stretch Bin Test Position   PTRx Ther Proc 6 - Details HEP   PTRx Ther Proc 7 Hip Abduction Straight Leg Raise   PTRx Ther Proc 7 - " "Details 2 x 15 reps bethany   PTRx Ther Proc 8 Bridging #1   PTRx Ther Proc 8 - Details progressed from 10 to 15 reps x 2 sets   PTRx Ther Proc 9 Hip Flexion Straight Leg Raise   PTRx Ther Proc 9 - Details 2 x 15 reps bethany    PTRx Ther Proc 10 Piriformis Stretch Above 90 Degress Supine   PTRx Ther Proc 10 - Details 3 x 30\" on right   PTRx Ther Proc 11 Hip Adduction Straight Leg Raise   PTRx Ther Proc 11 - Details 2 x 10 reps bilaterally    Skilled Intervention HEP for knee mobility and strengthening   Patient Response/Progress improved knee flexion AROM to 125 after exercise, no change in pain during or after exercise   Self Care/home Management   ADL/Home Mgmt Training (99673) 25   Self Care Self Care 2;Self Care 3   Self Care 1 brace   Self Care 1 - Details brace hinging the wrong way, adjusted with pt report of significant improvement in comfort level   Self Care 2 worsening pain and edema   Self Care 2 - Details continue to ice and elevate. Agreed to hold off on PT until further assessment. discussed next steps for seeking care.  encouraged to call to schedule MRI and follow up with Dr. Martinez, who may consider referral to orthopedist. Can return to PT after further assessment is complete and is cleared by MD.   Self Care 3 assistive device   Self Care 3 - Details pt still wanting to obtain but not sure if he wants insurance to cover   Skilled Intervention pain mgmt   Patient Response/Progress agreeable with plan   Education   Learner/Method Patient   Plan   Home program ptrx   Comments   Comments Pt has attended  7 physical therapy visits for bilateral R>L knee pain. Pt reports no change in pain and worsening right knee swelling as of the past few weeks. Pt agreeable to holding PT until further assessment has been completed.   Total Session Time   Timed Code Treatment Minutes 25   Total Treatment Time (sum of timed and untimed services) 25     "

## 2024-09-16 ENCOUNTER — HOSPITAL ENCOUNTER (EMERGENCY)
Facility: HOSPITAL | Age: 59
Discharge: HOME | End: 2024-09-16
Payer: COMMERCIAL

## 2024-09-16 VITALS
DIASTOLIC BLOOD PRESSURE: 104 MMHG | SYSTOLIC BLOOD PRESSURE: 148 MMHG | TEMPERATURE: 98 F | HEIGHT: 74 IN | BODY MASS INDEX: 25.4 KG/M2 | OXYGEN SATURATION: 99 % | WEIGHT: 197.9 LBS | RESPIRATION RATE: 20 BRPM | HEART RATE: 64 BPM

## 2024-09-16 DIAGNOSIS — G89.29 CHRONIC PAIN OF RIGHT KNEE: ICD-10-CM

## 2024-09-16 DIAGNOSIS — M25.561 CHRONIC PAIN OF RIGHT KNEE: ICD-10-CM

## 2024-09-16 PROCEDURE — 99283 EMERGENCY DEPT VISIT LOW MDM: CPT

## 2024-09-16 RX ORDER — OXYCODONE HYDROCHLORIDE 5 MG/1
5 TABLET ORAL EVERY 6 HOURS PRN
Qty: 12 TABLET | Refills: 0 | Status: SHIPPED | OUTPATIENT
Start: 2024-09-16 | End: 2024-09-19

## 2024-09-16 RX ORDER — OXYCODONE HYDROCHLORIDE 5 MG/1
5 TABLET ORAL ONCE
Status: DISCONTINUED | OUTPATIENT
Start: 2024-09-16 | End: 2024-09-16 | Stop reason: HOSPADM

## 2024-09-16 ASSESSMENT — COLUMBIA-SUICIDE SEVERITY RATING SCALE - C-SSRS
1. IN THE PAST MONTH, HAVE YOU WISHED YOU WERE DEAD OR WISHED YOU COULD GO TO SLEEP AND NOT WAKE UP?: NO
6. HAVE YOU EVER DONE ANYTHING, STARTED TO DO ANYTHING, OR PREPARED TO DO ANYTHING TO END YOUR LIFE?: NO
2. HAVE YOU ACTUALLY HAD ANY THOUGHTS OF KILLING YOURSELF IN THE PAST MONTH?: NO
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
## 2024-09-16 NOTE — DISCHARGE INSTRUCTIONS
I will be providing you with an emergent orthopedic referral to discuss further management of your known meniscus tear including surgery if they feel as though this is necessary.  In the meantime, I will send you home with a knee immobilizer, acute in the short course of pain medicines to help with your symptoms.  Please return to the emergency department for new or worsening symptoms.    For your pain we recommend trying;  Tylenol 1,000mg every 6 hours (as needed), up to 4,000mg/day  Ibuprofen 600 to 800mg every 6 hours (as needed), up to 3,200mg/day

## 2024-09-17 ENCOUNTER — PATIENT OUTREACH (OUTPATIENT)
Dept: CARE COORDINATION | Facility: CLINIC | Age: 59
End: 2024-09-17
Payer: COMMERCIAL

## 2024-09-17 NOTE — PROGRESS NOTES
Clinic Care Coordination Contact  Follow Up Progress Note      Assessment:  The pt was recently in the ED, I called to check up on the pt and help the pt setup a ED follow up. The pt was at [facility] for knee pain. I called the pt,but got his vm, so I left a vm for the pt to give me a call back.     Care Gaps:    Health Maintenance Due   Topic Date Due    ADVANCE CARE PLANNING  Never done    Pneumococcal Vaccine: Pediatrics (0 to 5 Years) and At-Risk Patients (6 to 64 Years) (1 of 2 - PCV) Never done    COLORECTAL CANCER SCREENING  Never done    HEPATITIS B IMMUNIZATION (1 of 3 - 19+ 3-dose series) Never done    ZOSTER IMMUNIZATION (1 of 2) Never done    LUNG CANCER SCREENING  Never done    INFLUENZA VACCINE (1) Never done    COVID-19 Vaccine (3 - 2024-25 season) 09/01/2024

## 2024-09-18 ENCOUNTER — PATIENT OUTREACH (OUTPATIENT)
Dept: CARE COORDINATION | Facility: CLINIC | Age: 59
End: 2024-09-18
Payer: COMMERCIAL

## 2024-09-18 NOTE — PROGRESS NOTES
Clinic Care Coordination Contact  Follow Up Progress Note      Assessment:  The pt was recently in the ED, I called to check up on the pt and help the pt setup a ED follow up. The pt was at [facility] for knee pain. I called the pt,but got his vm, so I left a vm for the pt to give me a call back.     Care Gaps:    Health Maintenance Due   Topic Date Due    ADVANCE CARE PLANNING  Never done    Pneumococcal Vaccine: Pediatrics (0 to 5 Years) and At-Risk Patients (6 to 64 Years) (1 of 2 - PCV) Never done    COLORECTAL CANCER SCREENING  Never done    HEPATITIS B IMMUNIZATION (1 of 3 - 19+ 3-dose series) Never done    ZOSTER IMMUNIZATION (1 of 2) Never done    LUNG CANCER SCREENING  Never done    INFLUENZA VACCINE (1) Never done    COVID-19 Vaccine (3 - 2024-25 season) 09/01/2024

## 2024-09-19 ENCOUNTER — PATIENT OUTREACH (OUTPATIENT)
Dept: CARE COORDINATION | Facility: CLINIC | Age: 59
End: 2024-09-19
Payer: COMMERCIAL

## 2024-09-19 NOTE — PROGRESS NOTES
Clinic Care Coordination Contact  Follow Up Progress Note      Assessment: The pt was recently in the ED, I called to check up on the pt and help the pt setup a ED follow up. The pt was at [facility] for knee pain. I called the pt,but got his vm, so I left a vm for the pt to give me a call back.     Care Gaps:    Health Maintenance Due   Topic Date Due    ADVANCE CARE PLANNING  Never done    Pneumococcal Vaccine: Pediatrics (0 to 5 Years) and At-Risk Patients (6 to 64 Years) (1 of 2 - PCV) Never done    COLORECTAL CANCER SCREENING  Never done    HEPATITIS B IMMUNIZATION (1 of 3 - 19+ 3-dose series) Never done    ZOSTER IMMUNIZATION (1 of 2) Never done    LUNG CANCER SCREENING  Never done    INFLUENZA VACCINE (1) Never done    COVID-19 Vaccine (3 - 2024-25 season) 09/01/2024
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
## 2024-09-20 ENCOUNTER — OFFICE VISIT (OUTPATIENT)
Dept: ORTHOPEDICS | Facility: CLINIC | Age: 59
End: 2024-09-20
Payer: COMMERCIAL

## 2024-09-20 VITALS — SYSTOLIC BLOOD PRESSURE: 148 MMHG | DIASTOLIC BLOOD PRESSURE: 90 MMHG

## 2024-09-20 DIAGNOSIS — M76.899 TENDINOSIS OF QUADRICEPS TENDON: ICD-10-CM

## 2024-09-20 DIAGNOSIS — S83.241A OTHER TEAR OF MEDIAL MENISCUS OF RIGHT KNEE AS CURRENT INJURY, INITIAL ENCOUNTER: Primary | ICD-10-CM

## 2024-09-20 DIAGNOSIS — M76.50 PATELLAR TENDINOSIS: ICD-10-CM

## 2024-09-20 DIAGNOSIS — M17.11 PRIMARY OSTEOARTHRITIS OF RIGHT KNEE: ICD-10-CM

## 2024-09-20 PROCEDURE — 99203 OFFICE O/P NEW LOW 30 MIN: CPT | Mod: 25 | Performed by: STUDENT IN AN ORGANIZED HEALTH CARE EDUCATION/TRAINING PROGRAM

## 2024-09-20 PROCEDURE — 20611 DRAIN/INJ JOINT/BURSA W/US: CPT | Mod: RT | Performed by: STUDENT IN AN ORGANIZED HEALTH CARE EDUCATION/TRAINING PROGRAM

## 2024-09-20 RX ORDER — ROPIVACAINE HYDROCHLORIDE 5 MG/ML
2 INJECTION, SOLUTION EPIDURAL; INFILTRATION; PERINEURAL
Status: SHIPPED | OUTPATIENT
Start: 2024-09-20

## 2024-09-20 RX ORDER — LIDOCAINE HYDROCHLORIDE 10 MG/ML
2 INJECTION, SOLUTION INFILTRATION; PERINEURAL
Status: SHIPPED | OUTPATIENT
Start: 2024-09-20

## 2024-09-20 RX ORDER — BETAMETHASONE SODIUM PHOSPHATE AND BETAMETHASONE ACETATE 3; 3 MG/ML; MG/ML
6 INJECTION, SUSPENSION INTRA-ARTICULAR; INTRALESIONAL; INTRAMUSCULAR; SOFT TISSUE
Status: SHIPPED | OUTPATIENT
Start: 2024-09-20

## 2024-09-20 RX ORDER — LIDOCAINE HYDROCHLORIDE 10 MG/ML
3 INJECTION, SOLUTION INFILTRATION; PERINEURAL
Status: SHIPPED | OUTPATIENT
Start: 2024-09-20

## 2024-09-20 RX ADMIN — ROPIVACAINE HYDROCHLORIDE 2 ML: 5 INJECTION, SOLUTION EPIDURAL; INFILTRATION; PERINEURAL at 09:22

## 2024-09-20 RX ADMIN — LIDOCAINE HYDROCHLORIDE 2 ML: 10 INJECTION, SOLUTION INFILTRATION; PERINEURAL at 09:22

## 2024-09-20 RX ADMIN — LIDOCAINE HYDROCHLORIDE 3 ML: 10 INJECTION, SOLUTION INFILTRATION; PERINEURAL at 09:22

## 2024-09-20 RX ADMIN — BETAMETHASONE SODIUM PHOSPHATE AND BETAMETHASONE ACETATE 6 MG: 3; 3 INJECTION, SUSPENSION INTRA-ARTICULAR; INTRALESIONAL; INTRAMUSCULAR; SOFT TISSUE at 09:22
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
## 2024-10-18 ENCOUNTER — THERAPY VISIT (OUTPATIENT)
Dept: PHYSICAL THERAPY | Facility: REHABILITATION | Age: 59
End: 2024-10-18
Payer: COMMERCIAL

## 2024-10-18 DIAGNOSIS — G89.29 CHRONIC PAIN OF RIGHT KNEE: Primary | ICD-10-CM

## 2024-10-18 DIAGNOSIS — M25.561 CHRONIC PAIN OF RIGHT KNEE: Primary | ICD-10-CM

## 2024-10-18 PROCEDURE — 97110 THERAPEUTIC EXERCISES: CPT | Mod: GP

## 2024-10-18 PROCEDURE — 97161 PT EVAL LOW COMPLEX 20 MIN: CPT | Mod: GP

## 2024-10-18 ASSESSMENT — ACTIVITIES OF DAILY LIVING (ADL)
HOW_WOULD_YOU_RATE_THE_OVERALL_FUNCTION_OF_YOUR_KNEE_DURING_YOUR_USUAL_DAILY_ACTIVITIES?: SEVERELY ABNORMAL
RISE FROM A CHAIR: ACTIVITY IS VERY DIFFICULT
WEAKNESS: THE SYMPTOM PREVENTS ME FROM ALL DAILY ACTIVITIES
SIT WITH YOUR KNEE BENT: ACTIVITY IS VERY DIFFICULT
WALK: ACTIVITY IS VERY DIFFICULT
HOW_WOULD_YOU_RATE_THE_OVERALL_FUNCTION_OF_YOUR_KNEE_DURING_YOUR_USUAL_DAILY_ACTIVITIES?: SEVERELY ABNORMAL
PAIN: THE SYMPTOM PREVENTS ME FROM ALL DAILY ACTIVITIES
KNEEL ON THE FRONT OF YOUR KNEE: ACTIVITY IS VERY DIFFICULT
SIT WITH YOUR KNEE BENT: ACTIVITY IS VERY DIFFICULT
WEAKNESS: THE SYMPTOM PREVENTS ME FROM ALL DAILY ACTIVITIES
GIVING WAY, BUCKLING OR SHIFTING OF KNEE: THE SYMPTOM PREVENTS ME FROM ALL DAILY ACTIVITIES
GO DOWN STAIRS: ACTIVITY IS VERY DIFFICULT
KNEE_ACTIVITY_OF_DAILY_LIVING_SCORE: 10
STIFFNESS: THE SYMPTOM PREVENTS ME FROM ALL DAILY ACTIVITIES
GIVING WAY, BUCKLING OR SHIFTING OF KNEE: THE SYMPTOM PREVENTS ME FROM ALL DAILY ACTIVITIES
PAIN: THE SYMPTOM PREVENTS ME FROM ALL DAILY ACTIVITIES
GO DOWN STAIRS: ACTIVITY IS VERY DIFFICULT
RAW_SCORE: 7
WALK: ACTIVITY IS VERY DIFFICULT
STAND: ACTIVITY IS VERY DIFFICULT
GO UP STAIRS: ACTIVITY IS VERY DIFFICULT
SQUAT: I AM UNABLE TO DO THE ACTIVITY
STIFFNESS: THE SYMPTOM PREVENTS ME FROM ALL DAILY ACTIVITIES
SWELLING: THE SYMPTOM PREVENTS ME FROM ALL DAILY ACTIVITIES
LIMPING: THE SYMPTOM PREVENTS ME FROM ALL DAILY ACTIVITIES
KNEEL ON THE FRONT OF YOUR KNEE: ACTIVITY IS VERY DIFFICULT
SQUAT: I AM UNABLE TO DO THE ACTIVITY
SWELLING: THE SYMPTOM PREVENTS ME FROM ALL DAILY ACTIVITIES
STAND: ACTIVITY IS VERY DIFFICULT
PLEASE_INDICATE_YOR_PRIMARY_REASON_FOR_REFERRAL_TO_THERAPY:: KNEE
LIMPING: THE SYMPTOM PREVENTS ME FROM ALL DAILY ACTIVITIES
GO UP STAIRS: ACTIVITY IS VERY DIFFICULT
RISE FROM A CHAIR: ACTIVITY IS VERY DIFFICULT
KNEE_ACTIVITY_OF_DAILY_LIVING_SUM: 7

## 2024-10-18 NOTE — PROGRESS NOTES
[facility]                                                                                   OUTPATIENT PHYSICAL THERAPY    PLAN OF TREATMENT FOR OUTPATIENT REHABILITATION   Patient's Last Name, First Name, Joshua Laurent    YOB: 1965   Provider's Name   [facility]   Medical Record No.  [id number]     Onset Date: 09/16/24 (order date 9/16/24, onset date years prior)  Start of Care Date: 10/18/24     Medical Diagnosis:  M25.561, G89.29 (ICD-10-CM) - Chronic pain of right knee      PT Treatment Diagnosis:  right knee stiffness, right knee weakness, right knee pain Plan of Treatment  Frequency/Duration: 1x/week/ up to 12 weeks    Certification date from 10/18/24 to 01/10/25         See note for plan of treatment details and functional goals     Milagros Allan PT                         I CERTIFY THE NEED FOR THESE SERVICES FURNISHED UNDER        THIS PLAN OF TREATMENT AND WHILE UNDER MY CARE     (Physician attestation of this document indicates review and certification of the therapy plan).              Referring Provider:  Laura Will    Initial Assessment  See Epic Evaluation- Start of Care Date: 10/18/24       10/18/24 0500   Appointment Info   Signing clinician's name / credentials Milagros Allan PT, DPT   Total/Authorized Visits up to 12   Visits Used 1   Medical Diagnosis M25.561, G89.29 (ICD-10-CM) - Chronic pain of right knee   PT Tx Diagnosis right knee stiffness, right knee weakness, right knee pain   Quick Adds Certification   Progress Note/Certification   Start of Care Date 10/18/24   Onset of illness/injury or Date of Surgery 09/16/24  (order date 9/16/24, onset date years prior)   Therapy Frequency 1x/week   Predicted Duration up to 12 weeks   Certification date from 10/18/24   Certification date to 01/10/25   Progress Note Due Date 11/29/24   Progress Note Completed Date 10/18/24   GOALS   PT Goals 2;3   PT Goal 1   Goal  Identifier HEP   Goal Description pt will demonstrate independence and report compliance with HEP to facilitate improved independent symptom mgmt.   Rationale to maximize safety and independence with performance of ADLs and functional tasks;to maximize safety and independence with self cares   Target Date 01/10/25   PT Goal 2   Goal Identifier gait   Goal Description pt will ambulate with LRAD for greater than or equal to 20 minutes with less than or equal to 5/10 pain   Rationale to maximize safety and independence with performance of ADLs and functional tasks;to maximize safety and independence within the home;to maximize safety and independence within the community;to maximize safety and independence with self cares   Target Date 01/10/25   PT Goal 3   Goal Identifier sit<>stand   Goal Description pt will demonstrate greater than or equal to 13 sit<>stands on 30 second sit<>stand test to demonstrate improve activity tolerance and functional strength   Rationale to maximize safety and independence with performance of ADLs and functional tasks;to maximize safety and independence within the home;to maximize safety and independence within the community;to maximize safety and independence with self cares   Target Date 01/10/25   Subjective Report   Subjective Report see eval   Treatment Interventions (PT)   Interventions Therapeutic Procedure/Exercise   Therapeutic Procedure/Exercise   PTRx Ther Proc 1 Supine Hamstring Stretch   PTRx Ther Proc 1 - Details good demo   PTRx Ther Proc 2 Supine Heel Slides   PTRx Ther Proc 2 - Details good demo, continue with self-overpressure   PTRx Ther Proc 3 Standing Gastroc Stretch   PTRx Ther Proc 3 - Details good demo   PTRx Ther Proc 4 Standing Soleus Stretch   PTRx Ther Proc 4 - Details good demo   PTRx Ther Proc 5 Sit to Stand No Hands   PTRx Ther Proc 5 - Details x 10 reps, good demo   PTRx Ther Proc 6  Hip Flexor Stretch Bin Test Position   PTRx Ther Proc 6 - Details VR    PTRx Ther Proc 7 Hip Abduction Straight Leg Raise   PTRx Ther Proc 7 - Details good demo   PTRx Ther Proc 8 Bridging #1   PTRx Ther Proc 8 - Details good demo   PTRx Ther Proc 9 Hip Flexion Straight Leg Raise   PTRx Ther Proc 9 - Details good demo   PTRx Ther Proc 10 Piriformis Stretch Above 90 Degress Supine   PTRx Ther Proc 10 - Details VR   PTRx Ther Proc 11 Hip Adduction Straight Leg Raise   PTRx Ther Proc 11 - Details cues for proper set up   Therapeutic Procedures: strength, endurance, ROM, flexibility minutes (45977) 25   Ther Proc 1 review of current HEP (provided during last course of therapy, see below)   Skilled Intervention verbal and tactile cues for form   Patient Response/Progress tolerated well, feels a little better after   Eval/Assessments   PT Eval, Low Complexity Minutes (99306) 12   Education   Learner/Method Patient   Plan   Home program ptrx   Plan for next session standing strengthening exercises   Total Session Time   Timed Code Treatment Minutes 25   Total Treatment Time (sum of timed and untimed services) 37
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
## 2024-11-13 ENCOUNTER — HOSPITAL ENCOUNTER (EMERGENCY)
Facility: HOSPITAL | Age: 59
Discharge: HOME | End: 2024-11-13
Admitting: EMERGENCY MEDICINE
Payer: COMMERCIAL

## 2024-11-13 VITALS
SYSTOLIC BLOOD PRESSURE: 177 MMHG | HEART RATE: 82 BPM | DIASTOLIC BLOOD PRESSURE: 90 MMHG | OXYGEN SATURATION: 99 % | RESPIRATION RATE: 18 BRPM

## 2024-11-13 DIAGNOSIS — G89.29 CHRONIC PAIN OF RIGHT KNEE: ICD-10-CM

## 2024-11-13 DIAGNOSIS — M25.561 CHRONIC PAIN OF RIGHT KNEE: ICD-10-CM

## 2024-11-13 PROCEDURE — 99282 EMERGENCY DEPT VISIT SF MDM: CPT | Performed by: EMERGENCY MEDICINE

## 2024-11-13 RX ORDER — OXYCODONE HYDROCHLORIDE 5 MG/1
5 TABLET ORAL EVERY 6 HOURS PRN
Qty: 12 TABLET | Refills: 0 | Status: SHIPPED | OUTPATIENT
Start: 2024-11-13 | End: 2024-11-16

## 2024-11-13 ASSESSMENT — ENCOUNTER SYMPTOMS
FEVER: 0
ARTHRALGIAS: 1
NUMBNESS: 0
CHILLS: 0

## 2024-11-13 ASSESSMENT — ACTIVITIES OF DAILY LIVING (ADL): ADLS_ACUITY_SCORE: 0

## 2024-11-13 NOTE — DISCHARGE INSTRUCTIONS
You were seen here today for evaluation of knee pain.  This is likely related to your chronic arthritis.    You may take Tylenol and ibuprofen for pain/fever, do not exceed 4000 mg of Tylenol per day or 3200 mg ofibuprofen per day.  Take oxycodone for severe pain- This is a narcotic pain medication that might be habit forming so only take when necessary. Do not drink alcohol,drive, or operate machinery while taking this medication.     Call Dr. Espitia's office to follow up with orthopedics.     Return to the ER for any new or worsening symptoms including severe pain, fever, excessive swelling of the knee, new warmth or redness of the knee, loss of sensation or function in the foot, or any other symptoms that concern you.
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
## 2024-12-31 ENCOUNTER — OFFICE VISIT (OUTPATIENT)
Dept: FAMILY MEDICINE | Facility: CLINIC | Age: 59
End: 2024-12-31
Payer: COMMERCIAL

## 2024-12-31 VITALS
SYSTOLIC BLOOD PRESSURE: 172 MMHG | HEART RATE: 64 BPM | TEMPERATURE: 97.7 F | WEIGHT: 193 LBS | OXYGEN SATURATION: 99 % | BODY MASS INDEX: 25.58 KG/M2 | DIASTOLIC BLOOD PRESSURE: 87 MMHG | RESPIRATION RATE: 22 BRPM | HEIGHT: 73 IN

## 2024-12-31 DIAGNOSIS — I10 HYPERTENSION, UNSPECIFIED TYPE: ICD-10-CM

## 2024-12-31 DIAGNOSIS — L98.9 SKIN LESION: ICD-10-CM

## 2024-12-31 DIAGNOSIS — M25.512 CHRONIC LEFT SHOULDER PAIN: Primary | ICD-10-CM

## 2024-12-31 DIAGNOSIS — M75.102 TEAR OF LEFT SUPRASPINATUS TENDON: ICD-10-CM

## 2024-12-31 DIAGNOSIS — G89.29 CHRONIC LEFT SHOULDER PAIN: Primary | ICD-10-CM

## 2024-12-31 DIAGNOSIS — S43.432S SUPERIOR GLENOID LABRUM LESION OF LEFT SHOULDER, SEQUELA: ICD-10-CM

## 2024-12-31 LAB
ANION GAP SERPL CALCULATED.3IONS-SCNC: 10 MMOL/L (ref 7–15)
BUN SERPL-MCNC: 17.8 MG/DL (ref 8–23)
CALCIUM SERPL-MCNC: 9.7 MG/DL (ref 8.8–10.4)
CHLORIDE SERPL-SCNC: 101 MMOL/L (ref 98–107)
CREAT SERPL-MCNC: 0.98 MG/DL (ref 0.67–1.17)
EGFRCR SERPLBLD CKD-EPI 2021: 89 ML/MIN/1.73M2
GLUCOSE SERPL-MCNC: 92 MG/DL (ref 70–99)
HCO3 SERPL-SCNC: 28 MMOL/L (ref 22–29)
POTASSIUM SERPL-SCNC: 3.8 MMOL/L (ref 3.4–5.3)
SODIUM SERPL-SCNC: 139 MMOL/L (ref 135–145)

## 2024-12-31 RX ORDER — CHLORTHALIDONE 25 MG/1
25 TABLET ORAL DAILY
Qty: 30 TABLET | Refills: 0 | Status: SHIPPED | OUTPATIENT
Start: 2024-12-31

## 2024-12-31 RX ORDER — LIDOCAINE 4 G/G
1 PATCH TOPICAL EVERY 24 HOURS
Qty: 20 PATCH | Refills: 2 | Status: SHIPPED | OUTPATIENT
Start: 2024-12-31

## 2024-12-31 NOTE — PROGRESS NOTES
Preceptor Attestation:   Patient seen, evaluated and discussed with the resident. I have verified the content of the note, which accurately reflects my assessment of the patient and the plan of care.  Supervising Physician:Sunshine Jon MD  [facility]

## 2024-12-31 NOTE — PROGRESS NOTES
"  Assessment & Plan     (M25.512,  G89.29) Chronic left shoulder pain  (primary encounter diagnosis)  (M75.102) Tear of left supraspinatus tendon  (S43.432S) Superior glenoid labrum lesion of left shoulder, sequela  Comment: Overall, pain of the L shoulder and limited ROM are consistent with progression of his known labral tear and his partial supraspinatus tendon rupture. Given his last workup was 10 years ago, suspect he may be at risk for GH arthritis vs full tear at this time. Will trial lidocaine patches, PT. Will refer to ortho for further workup, has been seeing Dr. Espitia for knees, could follow up with him if considering CSI in shoulder for related symptoms. Will follow-up as needed, could trial oral NSAIDs.  Plan: Lidocaine (LIDOCARE) 4 % Patch, Physical         Therapy  Referral, Orthopedic          Referral          (L98.9) Skin lesion  Comment: On forehead, epidermoid cyst vs scar tissue. Patient requesting removal, however with location on face and risk of scarring, will refer to derm for removal.  Plan: Adult Dermatology  Referral          (I10) Hypertension, unspecified type  Comment: /82 today, previously elevated. Will initiate treatment with chlorthalidone today, follow-up in 2 weeks with PCP for BP check. BMP ordered to evaluate renal function.  Plan: Basic metabolic panel, chlorthalidone         (HYGROTON) 25 MG tablet            BMI  Estimated body mass index is 25.46 kg/m  as calculated from the following:    Height as of this encounter: 1.854 m (6' 1\").    Weight as of this encounter: 87.5 kg (193 lb).   Weight management plan: Patient was referred to their PCP to discuss a diet and exercise plan.          Return in about 2 weeks (around 1/14/2025) for Follow up with Dr. Martinez.      Ebenezer Lewis is a 59 year old, presenting for the following health issues:  Knee Pain, Neck Problem, and Hypertension    HPI     Here for a couple of complaints:    L neck pain " with radiation down the arm:  Has been worse over the last week  Uncomfortable, pain 8/10, relaxing makes it better, nothing makes it worse  Mild weakness, no numbness or tingling  Feels weak on his shoulder, does feel like he can't reach over his head  On chart review, does have some shoulder cuff pathology of the L shoulder dating back to 2012  Per MRI review:  MRI OF THE LEFT SHOULDER: 4/5/2012     CONCLUSION:   1) Focal partial thickness tear of the undersurface of the supraspinatus   without evidence for full thickness tear or retraction.     2) Tendinopathy within the remainder of the supraspinatus and anterior   infraspinatus.     3) Biceps tendon tendinopathy and tenosynovitis.     4) Chronic appearing SLAP-type tear of the superior labrum with associated   paralabral cysts along the posterosuperior corner.     5) Degenerative change at the glenohumeral joint with thinning of the   articular cartilage of the glenoid and subsurface irregularity.     6) Chronic sprain or inflammation of the joint capsule about the axillary   recess and inferior glenohumeral ligament.     7) Additional degenerative change at the acromioclavicular joint.     8) No evidence for fracture.     Was initially scheduled for surgery but missed his appt back then so he has not gone back  Does feel like it's gotten worse  Can feel his hand just fine just not as good as he would like  Uses a neck pillow for pain and discomfort, Tylenol 500mg as needed for pain (was taking 2-3 at a time)      Forehead knot:  Been present for several years after a fall in the winter  Hit his head, is a residual scar  Would like it removed      Of note, patient has high BP today in clinic  Blood pressure 172/87 today, chronically elevated  Will start HTN medication today  Denies headache, chest pain, SOB, dizziness        Review of Systems  Constitutional, neuro, ENT, endocrine, pulmonary, cardiac, gastrointestinal, genitourinary, musculoskeletal, integument  "and psychiatric systems are negative, except as otherwise noted.      Objective    BP (!) 172/87   Pulse 64   Temp 97.7  F (36.5  C)   Resp 22   Ht 1.854 m (6' 1\")   Wt 87.5 kg (193 lb)   SpO2 99%   BMI 25.46 kg/m    Body mass index is 25.46 kg/m .    Physical Exam   General: Alert, no acute distress  Skin: clean, dry, and intact. Small <0.5cm hard mobile lesion on forehead, nontender to palpation and mobile, nonfluctuant.  HEENT: normocephalic, atraumatic, spontaneous eye movement, no injection or icterus, ears and nose normal, no neck masses  Resp: normal work of breathing, no wheezing  Cardio: RRR, S1 and S2 present  Abdomen: nondistended, no masses  L shoulder: No visual deformities. TTP along glenohumeral joint line, AC joint. Only able to flex and abduct to about 90 degrees with stiffness and discomfort, extension, internal rotation, and external rotation full with mild discomfort.   Neuro: CN II-XII grossly intact, gait normal  Psych: normal mood, normal affect          Signed Electronically by: Jayashree Stringer MD  Precepted patient with Dr. Sunshine Jon.      "

## 2025-01-02 PROBLEM — M25.561 CHRONIC PAIN OF RIGHT KNEE: Status: RESOLVED | Noted: 2024-04-03 | Resolved: 2025-01-02

## 2025-01-02 PROBLEM — G89.29 CHRONIC PAIN OF RIGHT KNEE: Status: RESOLVED | Noted: 2024-04-03 | Resolved: 2025-01-02

## 2025-04-14 ENCOUNTER — PATIENT OUTREACH (OUTPATIENT)
Dept: CARE COORDINATION | Facility: CLINIC | Age: 60
End: 2025-04-14
Payer: COMMERCIAL